# Patient Record
Sex: FEMALE | Race: WHITE | NOT HISPANIC OR LATINO | Employment: OTHER | URBAN - METROPOLITAN AREA
[De-identification: names, ages, dates, MRNs, and addresses within clinical notes are randomized per-mention and may not be internally consistent; named-entity substitution may affect disease eponyms.]

---

## 2017-03-28 NOTE — PATIENT DISCUSSION
3. MGD: Recommend hot compresses twice a day and artificial tear ointment at bedtime for 1 week. If continued discomfort follow up as needed.

## 2017-03-28 NOTE — PATIENT DISCUSSION
New Prescription: Besivance (besifloxacin): drops,suspension: 0.6% 1 drop every two hours as directed into right eye 03-

## 2017-03-28 NOTE — PATIENT DISCUSSION
1. Herpetic Keratitis OD - add Valtrex 1g TID for 1 week, Besivance [id number] for 2 days then QID. May need to add Zirgan/Steroid. Appears lonstanding. Concern for scarring.  Callback instructions given

## 2017-03-31 NOTE — PATIENT DISCUSSION
Continue: Besivance (besifloxacin): drops,suspension: 0.6% 1 drop every two hours as directed into right eye 03-

## 2017-03-31 NOTE — PATIENT DISCUSSION
Herpetic Keratitis OD - Improving Cont Valtrex 1g TID until out, Besivance QID. Add PF free AT's. Consider keeping the PF At's cold. May need to add Zirgan/Steroid. Appears longstanding. Concern for scarring.  Callback instructions given

## 2017-04-03 NOTE — PATIENT DISCUSSION
New Prescription: Pred Forte (prednisolone acetate): drops,suspension: 1% 1 drop four times a day as directed into right eye 04-

## 2017-04-05 NOTE — PATIENT DISCUSSION
Herpetic Keratitis OD - Improving with residual stromal scar. Cont Valtrex 1g TID until out, Besivance Qhs.  Predforte QID

## 2017-04-05 NOTE — PATIENT DISCUSSION
Continue: Pred Forte (prednisolone acetate): drops,suspension: 1% 1 drop four times a day as directed into right eye 04-

## 2017-04-19 NOTE — PATIENT DISCUSSION
Herpetic Keratitis, OD - Resolved. Begin Predforte taper OD at TID x 3 days, then BID x 3 days, then QD x 3 days, then discontinue. Discontinue all other drops.

## 2017-04-19 NOTE — PATIENT DISCUSSION
Dendritic Keratitis Counseling: The nature of the diagnosis was discussed with the patient, including the risk of neurotrophic ulceration, corneal scarring, blindness, infection, loss of the eye, eye pain, and need for surgical intervention. I have stressed compliance with treatment and follow-up instructions. The patient was instructed to call back and return for care immediately if symptoms worsen.

## 2017-06-30 NOTE — PATIENT DISCUSSION
New Prescription: Valtrex (valacyclovir): tablet: 1 g 1 tablet three times a day as directed by mouth 06-

## 2017-06-30 NOTE — PATIENT DISCUSSION
RECURRENT HERPETIC KERATITIS OD - RESIDUAL STROMAL OPACITY; RESTART VALTREX 1G TID FOR 1 WEEK, SAMPLE OF BESIVANCE GIVEN,  QHS FOR 1 WEEK.  CONSIDER 500MG VALTREX PPX LONGTERM TREATMENT AT NEXT VISIT

## 2017-07-05 NOTE — PATIENT DISCUSSION
New Prescription: Pred Forte (prednisolone acetate): drops,suspension: 1% 1 drop twice a day as directed into right eye 07-

## 2017-07-05 NOTE — PATIENT DISCUSSION
RECURRENT HERPETIC KERATITIS OD - RESIDUAL STROMAL OPACITY; CONTINUE VALTREX 1G TID FOR 1 WEEK, QHS FOR 1 WEEK.

## 2017-07-19 NOTE — PATIENT DISCUSSION
RECURRENT HERPETIC KERATITIS OD - RESIDUAL STROMAL OPACITY; CONTINUE VALTREX 500MG VALTREX DAILY PROPHYLAXIS FOR ONE YEAR

## 2017-07-19 NOTE — PATIENT DISCUSSION
Continue: Pred Forte (prednisolone acetate): drops,suspension: 1% 1 drop twice a day as directed into right eye 07-

## 2017-07-25 NOTE — PATIENT DISCUSSION
RECURRENT HERPETIC KERATITIS OD - RESIDUAL STROMAL OPACITY; CONTINUE VALTREX 500MG VALTREX DAILY PROPHYLAXIS FOR ONE YEAR. CONT PREDFORTE BID X 2 DAYS THEN QID X 2 DAYS, THEN BACK TO BID OD. ADD GEL DROP AT BEDTIME.

## 2017-08-04 NOTE — PATIENT DISCUSSION
Continue: Refresh Optive (carboxymethylcellulose-glycern): drops: 0.5-0.9% 1 drop four times a day as needed into both eyes I emailed the patient and resulted her ultrasound and did referral Dr. Ramon Gupta

## 2017-08-04 NOTE — PATIENT DISCUSSION
HSV Keratitis OD - Discussed recurrence in detail with pt. Stop Trifluredine. Continue Valtrex 500 mg QD PO for prophylaxis. Okay to use daily PO. Pt knows to increase to BID if she feels a flare up, and call for appointment. Celluvisc PRN discomfort.

## 2017-08-15 NOTE — PATIENT DISCUSSION
RECURRENT HERPETIC KERATITIS OD - IMPROVING RESIDUAL STROMAL OPACITY; CONTINUE VALTREX 500MG DAILY PROPHYLAXIS FOR ONE YEAR.  CONT ZIRGAN PRN

## 2017-08-15 NOTE — PATIENT DISCUSSION
Continue: Refresh Optive (carboxymethylcellulose-glycern): drops: 0.5-0.9% 1 drop four times a day as needed into both eyes

## 2018-06-06 NOTE — PATIENT DISCUSSION
RECURRENT HERPETIC KERATITIS OD - IMPROVING RESIDUAL STROMAL OPACITY; CONTINUE VALTREX 500MG DAILY PROPHYLAXIS FOR ONE YEAR. CONT ZIRGAN PRN. USE ARTIFICIAL TEARS PRN.

## 2022-11-04 ENCOUNTER — NEW PATIENT (OUTPATIENT)
Dept: URBAN - METROPOLITAN AREA CLINIC 38 | Facility: CLINIC | Age: 32
End: 2022-11-04

## 2022-11-04 DIAGNOSIS — L98.8: ICD-10-CM

## 2022-11-04 PROCEDURE — 64612C BOTOX / COSMETIC

## 2023-06-29 ENCOUNTER — ESTABLISHED PATIENT (OUTPATIENT)
Dept: URBAN - METROPOLITAN AREA CLINIC 44 | Facility: CLINIC | Age: 33
End: 2023-06-29

## 2023-06-29 DIAGNOSIS — L98.8: ICD-10-CM

## 2023-06-29 PROCEDURE — 64612C BOTOX / COSMETIC
